# Patient Record
Sex: MALE | Race: BLACK OR AFRICAN AMERICAN
[De-identification: names, ages, dates, MRNs, and addresses within clinical notes are randomized per-mention and may not be internally consistent; named-entity substitution may affect disease eponyms.]

---

## 2017-01-16 ENCOUNTER — HOSPITAL ENCOUNTER (EMERGENCY)
Dept: HOSPITAL 17 - NEPB | Age: 27
Discharge: HOME | End: 2017-01-16
Payer: COMMERCIAL

## 2017-01-16 VITALS — WEIGHT: 130.07 LBS | HEIGHT: 71 IN | BODY MASS INDEX: 18.21 KG/M2

## 2017-01-16 VITALS
SYSTOLIC BLOOD PRESSURE: 136 MMHG | OXYGEN SATURATION: 86 % | DIASTOLIC BLOOD PRESSURE: 85 MMHG | RESPIRATION RATE: 16 BRPM | TEMPERATURE: 98 F | HEART RATE: 84 BPM

## 2017-01-16 DIAGNOSIS — E11.65: Primary | ICD-10-CM

## 2017-01-16 PROCEDURE — 99281 EMR DPT VST MAYX REQ PHY/QHP: CPT

## 2017-01-16 NOTE — PD
HPI


Chief Complaint:  Medication Refill Request


Time Seen by Provider:  19:34


Travel History


International Travel<30 days:  No


Contact w/Intl Traveler<30days:  No


Traveled to known affect area:  No





History of Present Illness


HPI


26-year-old black male with a history of IDDM presents to emergency department 

requesting a refill of his Humalog and Lantus insulin.  He states that he is 

just gotten a new insurance and cannot see a new provider until the end of the 

month.  He is requesting a refill.  He denies any acute medical complaints.  No 

fever or chills.  No nausea vomiting.  No abdominal issues.  He states that his 

sugars have been under control.





PFSH


Past Medical History


Asthma:  Yes


Autoimmune Disease:  No


Cancer:  No


Cardiovascular Problems:  Yes


Diabetes:  Yes


Diminished Hearing:  No


Endocrine:  Yes (type 1 diabetic)


Genitourinary:  No


Immune Disorder:  No


Musculoskeletal:  No


Neurologic:  No


Psychiatric:  No


Reproductive:  No


Respiratory:  No


Immunizations Current:  No


Sickle Cell Disease:  No





Past Surgical History


AICD:  No


Arteriovenous Shunt:  No


Insulin Pump:  No


Joint Replacement:  No


Pacemaker:  Yes





Social History


Alcohol Use:  Yes (OCCASIONALLY)


Tobacco Use:  No


Substance Use:  Yes (MARIJUANA OCC)





Allergies-Medications


(Allergen,Severity, Reaction):  


Coded Allergies:  


     No Known Allergies (Verified , 1/16/17)


Reported Meds & Prescriptions





Reported Meds & Active Scripts


Active


Reported


Crestor (Rosuvastatin Calcium) 10 Mg Tab 10 Mg PO DAILY


Levemir Inj (Insulin Detemir) 1,000 unit/ 10 ML Vial 30 Units SQ DAILY


     Do not mix with any other Insulin.


Novolog Inj (Insulin Aspart) 1,000 Unit/10 Ml Vial 2-12 Units SQ ACHS


     Max dose at bedtime ( ) units; sugars less than 70,(0) units; sugars


     150-199,(2) units; sugars 200-249,(4) units; sugars 250-299,(7) units;


     sugars 300-349,(10) units; sugars greater than 349,(12)units


Lisinopril 5 Mg Tab 5 Mg PO DAILY








Review of Systems


Except as stated in HPI:  all other systems reviewed are Neg





Physical Exam


Narrative


GENERAL: This is a well-nourished, well-developed patient, in no apparent 

distress.


SKIN: No rashes, ecchymoses or lesions. Warm and dry.


HEAD: Atraumatic. Normocephalic.


EYES: PERRL, EOMI, no discharge or injection. No scleral icterus.


EARS: Clear


NOSE: Nasal turbinates appear normal.


THROAT: Mucosa pink and moist.  Airway patent.


NECK: Trachea midline. supple, moves head freely.


LUNGS: Clear to auscultation.


CV: Regular in rhythm.


ABDOMEN: Soft nontender.


EXT: No clubbing cyanosis or edema.





Data


Data


Last Documented VS





Vital Signs








  Date Time  Temp Pulse Resp B/P Pulse Ox O2 Delivery O2 Flow Rate FiO2


 


1/16/17 19:32   16     


 


1/16/17 19:14 98.0 84  136/85 86   











MDM


Medical Decision Making


Medical Screen Exam Complete:  Yes


Emergency Medical Condition:  Yes


Medical Record Reviewed:  Yes


Differential Diagnosis


Differential diagnosis: IDDM, hypoglycemia, hyperglycemia, med refill


Narrative Course


The patient is given a refill of his Humalog and Lantus.  The patient states 

that he takes Humalog on a sliding scale and Lantus 30 mg in the morning.





Diagnosis





 Primary Impression:  


 Hyperglycemia due to type 1 diabetes mellitus


 Additional Impression:  


 Medication refill


Patient Instructions:  General Instructions





***Additional Instructions:


Rest.


Monitor sugars daily.


Medications as directed.


Follow-up with a medical doctor within the next 2-4 weeks.


Return to the ER for any problems.


***Med/Other Pt SpecificInfo:  Prescription(s) given


Scripts


Insulin Glargine Inj (Lantus Inj)1,000 Unit/10 Ml Vial30 Units SQ AC BREAKFAST  

#1 VIAL  Ref 1


   Prov:Hanna Solis MD         1/16/17 


Insulin Lispro (Human) Inj (Humalog Inj)1,000 Unit/10 Ml Vial5-25 Units SQ ACHS

  #1 VIAL  Ref 1


   Max dose at bedtime:(  )units; sugars < 70,(0)units; sugars


   150-199,(5)units; sugars 200-249,(10)units; sugars 250-299,(15)units;


   sugars 300-349,(20)units; sugars more than 349,(25)units.


   Prov:Hanna Solis MD         1/16/17


Disposition:  01 DISCHARGE HOME


Condition:  Stable








Harsh Tsang Jan 16, 2017 19:37

## 2017-01-25 ENCOUNTER — HOSPITAL ENCOUNTER (EMERGENCY)
Dept: HOSPITAL 17 - NED | Age: 27
Discharge: LEFT BEFORE DISCHARGE | End: 2017-01-25
Payer: COMMERCIAL

## 2017-01-25 VITALS
DIASTOLIC BLOOD PRESSURE: 71 MMHG | OXYGEN SATURATION: 98 % | RESPIRATION RATE: 15 BRPM | SYSTOLIC BLOOD PRESSURE: 117 MMHG | HEART RATE: 78 BPM | TEMPERATURE: 98.2 F

## 2017-01-25 DIAGNOSIS — R06.00: Primary | ICD-10-CM

## 2017-01-25 DIAGNOSIS — R11.10: ICD-10-CM

## 2017-01-25 PROCEDURE — 99281 EMR DPT VST MAYX REQ PHY/QHP: CPT

## 2017-03-08 ENCOUNTER — HOSPITAL ENCOUNTER (EMERGENCY)
Dept: HOSPITAL 17 - PHED | Age: 27
Discharge: HOME | End: 2017-03-08
Payer: COMMERCIAL

## 2017-03-08 VITALS
DIASTOLIC BLOOD PRESSURE: 95 MMHG | OXYGEN SATURATION: 98 % | HEART RATE: 78 BPM | SYSTOLIC BLOOD PRESSURE: 132 MMHG | TEMPERATURE: 98.1 F

## 2017-03-08 VITALS — SYSTOLIC BLOOD PRESSURE: 133 MMHG | OXYGEN SATURATION: 98 % | DIASTOLIC BLOOD PRESSURE: 93 MMHG | HEART RATE: 86 BPM

## 2017-03-08 VITALS — BODY MASS INDEX: 30.71 KG/M2 | WEIGHT: 219.36 LBS | HEIGHT: 71 IN

## 2017-03-08 DIAGNOSIS — E78.00: ICD-10-CM

## 2017-03-08 DIAGNOSIS — Z76.0: ICD-10-CM

## 2017-03-08 DIAGNOSIS — I10: ICD-10-CM

## 2017-03-08 DIAGNOSIS — Z79.4: ICD-10-CM

## 2017-03-08 DIAGNOSIS — E10.65: Primary | ICD-10-CM

## 2017-03-08 DIAGNOSIS — Z95.0: ICD-10-CM

## 2017-03-08 LAB
ALP SERPL-CCNC: 149 U/L (ref 45–117)
ALT SERPL-CCNC: 26 U/L (ref 12–78)
ANION GAP SERPL CALC-SCNC: 10 MEQ/L (ref 5–15)
AST SERPL-CCNC: 14 U/L (ref 15–37)
B-OH-BUTYR SERPL-SCNC: 0.18 MMOL/L (ref 0–0.39)
BASOPHILS # BLD AUTO: 0.2 TH/MM3 (ref 0–0.2)
BASOPHILS NFR BLD: 2.8 % (ref 0–2)
BILIRUB SERPL-MCNC: 0.7 MG/DL (ref 0.2–1)
BLOOD GAS VENOUS BASE EXCESS: -0.5 MMOL/L (ref -2–2)
BLOOD GAS VENOUS HCO3: 24 MMOL/L (ref 22–26)
BLOOD GAS VENOUS PCO2: 45 MMHG (ref 44–48)
BLOOD GAS VENOUS PH: 7.35 (ref 7.36–7.4)
BLOOD GAS VENOUS PO2: 60 MMHG (ref 35–40)
BUN SERPL-MCNC: 15 MG/DL (ref 7–18)
CD3+CD4+ CELLS # BLD: 85 % (ref 70–76)
CHLORIDE SERPL-SCNC: 98 MEQ/L (ref 98–107)
COLOR UR: (no result)
COMMENT (UR): (no result)
CRITICAL VALUE: YES
CULTURE IF INDICATED: (no result)
DRAW SITE: (no result)
EOSINOPHIL # BLD: 0.1 TH/MM3 (ref 0–0.4)
EOSINOPHIL NFR BLD: 0.9 % (ref 0–4)
ERYTHROCYTE [DISTWIDTH] IN BLOOD BY AUTOMATED COUNT: 13.9 % (ref 11.6–17.2)
GFR SERPLBLD BASED ON 1.73 SQ M-ARVRAT: 74 ML/MIN (ref 89–?)
GLUCOSE UR STRIP-MCNC: (no result) MG/DL
HCO3 BLD-SCNC: 27.5 MEQ/L (ref 21–32)
HCT VFR BLD CALC: 41.1 % (ref 39–51)
HEMO FLAGS: (no result)
HGB UR QL STRIP: (no result)
KETONES UR STRIP-MCNC: (no result) MG/DL
LYMPHOCYTES # BLD AUTO: 2.3 TH/MM3 (ref 1–4.8)
LYMPHOCYTES NFR BLD AUTO: 36.1 % (ref 9–44)
MCH RBC QN AUTO: 27.6 PG (ref 27–34)
MCHC RBC AUTO-ENTMCNC: 33.4 % (ref 32–36)
MCV RBC AUTO: 82.6 FL (ref 80–100)
METHAMPHET UR QL: 15.7 VOL % (ref 9–17)
MONOCYTES NFR BLD: 7.1 % (ref 0–8)
NEUTROPHILS # BLD AUTO: 3.3 TH/MM3 (ref 1.8–7.7)
NEUTROPHILS NFR BLD AUTO: 53.1 % (ref 16–70)
NITRITE UR QL STRIP: (no result)
O2/TOTAL GAS SETTING VFR VENT: 21 %
PLATELET # BLD: 208 TH/MM3 (ref 150–450)
POTASSIUM SERPL-SCNC: 4.2 MEQ/L (ref 3.5–5.1)
RBC # BLD AUTO: 4.98 MIL/MM3 (ref 4.5–5.9)
SODIUM SERPL-SCNC: 135 MEQ/L (ref 136–145)
SP GR UR STRIP: 1.02 (ref 1–1.03)
SQUAMOUS #/AREA URNS HPF: (no result) /HPF (ref 0–5)
STAT: YES
TEMP CORR TO: 98.6
WBC # BLD AUTO: 6.4 TH/MM3 (ref 4–11)

## 2017-03-08 PROCEDURE — 85025 COMPLETE CBC W/AUTO DIFF WBC: CPT

## 2017-03-08 PROCEDURE — 96374 THER/PROPH/DIAG INJ IV PUSH: CPT

## 2017-03-08 PROCEDURE — 81001 URINALYSIS AUTO W/SCOPE: CPT

## 2017-03-08 PROCEDURE — 80053 COMPREHEN METABOLIC PANEL: CPT

## 2017-03-08 PROCEDURE — 82010 KETONE BODYS QUAN: CPT

## 2017-03-08 PROCEDURE — 99285 EMERGENCY DEPT VISIT HI MDM: CPT

## 2017-03-08 PROCEDURE — 82805 BLOOD GASES W/O2 SATURATION: CPT

## 2017-03-08 PROCEDURE — 96361 HYDRATE IV INFUSION ADD-ON: CPT

## 2017-03-08 NOTE — PD
HPI


Chief Complaint:  Diabetic


Time Seen by Provider:  01:49


Travel History


International Travel<30 days:  No


Contact w/Intl Traveler<30days:  No


Traveled to known affect area:  No





History of Present Illness


HPI


26-year-old male presents to the emergency department for elevated blood sugar.

  Patient is insulin-dependent diabetic since the age of 13.  Patient states 

that he has been out of his insulin since Friday.  Patient notes that he has 

been decreasing his oral intake because he hasn't had insulin available to take 

for his blood sugar management.  Patient denies fever chills nausea vomiting 

shortness of breath chest pain back pain abdominal pain and has had no recent 

injuries skin rash or joint pain or swelling diarrheal illness or respiratory 

illness.  Patient states that he is in the process of trying to become 

established with a new primary care provider but has actually been without a 

primary managing physician for approximately 5 months.  Patient has been 

getting refills to the emergency department for his insulin.  Patient was last 

given refill of medication in January through [facility].  Patient denies 

other concerns or complaints.





PFSH


Past Medical History


*** Narrative Medical


Asthma dyslipidemia and insulin-dependent diabetes; prior tobacco use 

occasional marijuana use occasional alcohol use; nursing notes reviewed


Asthma:  Yes


Cancer:  No


Cardiovascular Problems:  Yes


High Cholesterol:  Yes


Diabetes:  Yes


Patient Takes Glucophage:  No


Diminished Hearing:  No


Endocrine:  Yes (type 1 diabetic)


Hypertension:  Yes


Immune Disorder:  No


Immunizations Current:  No


Sickle Cell Disease:  No


Tetanus Vaccination:  Unknown


Influenza Vaccination:  Yes





Past Surgical History


Surgical History:  No Previous Surgery


AICD:  No


Arteriovenous Shunt:  No


Insulin Pump:  No


Joint Replacement:  No


Pacemaker:  Yes





Social History


Alcohol Use:  Yes (OCCASIONALLY)


Tobacco Use:  Yes (quit 02/17/17)


Substance Use:  Yes (MARIJUANA OCC)





Allergies-Medications


(Allergen,Severity, Reaction):  


Coded Allergies:  


     No Known Allergies (Verified , 3/8/17)


Reported Meds & Prescriptions





Reported Meds & Active Scripts


Active


Humalog Inj (Insulin Human Lispro) 1,000 Unit/10 Ml Vial 5-25 Units SQ ACHS


     Max dose at bedtime:(  )units; sugars < 70,(0)units; sugars


     150-199,(5)units; sugars 200-249,(10)units; sugars 250-299,(15)units;


     sugars 300-349,(20)units; sugars more than 349,(25)units.


Lantus Inj (Insulin Glargine) 1,000 Unit/10 Ml Vial 30 Units SQ AC DINNER


Lantus Inj (Insulin Glargine) 1,000 Unit/10 Ml Vial 30 Units SQ AC BREAKFAST


Humalog Inj (Insulin Human Lispro) 1,000 Unit/10 Ml Vial 5-25 Units SQ ACHS


     Max dose at bedtime:(  )units; sugars < 70,(0)units; sugars


     150-199,(5)units; sugars 200-249,(10)units; sugars 250-299,(15)units;


     sugars 300-349,(20)units; sugars more than 349,(25)units.








Review of Systems


Except as stated in HPI:  all other systems reviewed are Neg


General / Constitutional:  No: Fever, Chills


Eyes:  No: Visual changes


HENT:  No: Headaches, Lightheadedness, Sore Throat, Congestion


Cardiovascular:  No: Chest Pain or Discomfort, Palpitations, Diaphoresis


Respiratory:  No: Shortness of Breath


Gastrointestinal:  No: Nausea, Vomiting, Diarrhea, Abdominal Pain


Genitourinary:  No: Urgency, Frequency, Dysuria


Musculoskeletal:  No: Myalgias, Arthralgias


Skin:  No Rash


Neurologic:  No: Weakness, Dizziness, Syncope


Psychiatric:  No: Anxiety


Endocrine:  Positive: Polyuria


Hematologic/Lymphatic:  No: Easy Bruising





Physical Exam


Narrative


GENERAL: Well-developed well-nourished male in no acute distress no respiratory 

distress; GCS 15


SKIN: Warm and dry.


HEAD: Normocephalic.


EYES: No scleral icterus. No injection or drainage.  ENT: Mucous membranes 

moist airway is patent.


NECK: Supple, trachea midline. No JVD or lymphadenopathy.


CARDIOVASCULAR: Regular rate and rhythm without murmurs, gallops, or rubs. 


RESPIRATORY: Breath sounds equal bilaterally. No accessory muscle use.


GASTROINTESTINAL: Abdomen soft, non-tender, nondistended. 


MUSCULOSKELETAL: No cyanosis, or edema. 


BACK: Nontender without obvious deformity. No CVA tenderness.








Data


Data


Last Documented VS





Vital Signs








  Date Time  Temp Pulse Resp B/P Pulse Ox O2 Delivery O2 Flow Rate FiO2


 


3/8/17 04:44  86  133/93 98 Room Air  


 


3/8/17 00:55 98.1       








Orders





 Complete Blood Count With Diff (3/8/17 01:43)


Comprehensive Metabolic Panel (3/8/17 01:43)


Beta Hydroxybutyrate (Acetone) (3/8/17 01:43)


Urinalysis - C+S If Indicated (3/8/17 01:43)


Blood Glucose (3/8/17 01:43)


Ecg Monitoring (3/8/17 01:43)


Iv Access Insert/Monitor (3/8/17 01:43)


Oximetry (3/8/17 01:43)


NPO (3/8/17 01:43)


Sodium Chlor 0.9% 1000 Ml Inj (Ns 1000 M (3/8/17 01:43)


Sodium Chlor 0.9% 1000 Ml Inj (Ns 1000 M (3/8/17 02:13)


Insulin Human Regular Inj (Novolin R Inj (3/8/17 01:45)


Blood Gas Venous (Vbg) (3/8/17 03:19)





Labs





 Laboratory Tests








Test 3/8/17 3/8/17 3/8/17





 01:30 01:45 03:35


 


Urine Color STRAW   


 


Urine Turbidity CLEAR   


 


Urine pH 6.5   


 


Urine Specific Gravity 1.023   


 


Urine Protein NEG mg/dL  


 


Urine Glucose (UA) 1000 OR  





 GREATER mg/dL  


 


Urine Ketones NEG mg/dL  


 


Urine Occult Blood NEG   


 


Urine Nitrite NEG   


 


Urine Bilirubin NEG   


 


Urine Leukocyte Esterase NEG   


 


Urine Squamous Epithelial 0-5 /hpf  





Cells   


 


Microscopic Urinalysis Comment CULT NOT  





 INDICATED  


 


White Blood Count  6.4 TH/MM3 


 


Red Blood Count  4.98 MIL/MM3 


 


Hemoglobin  13.7 GM/DL 


 


Hematocrit  41.1 % 


 


Mean Corpuscular Volume  82.6 FL 


 


Mean Corpuscular Hemoglobin  27.6 PG 


 


Mean Corpuscular Hemoglobin  33.4 % 





Concent   


 


Red Cell Distribution Width  13.9 % 


 


Platelet Count  208 TH/MM3 


 


Mean Platelet Volume  8.6 FL 


 


Neutrophils (%) (Auto)  53.1 % 


 


Lymphocytes (%) (Auto)  36.1 % 


 


Monocytes (%) (Auto)  7.1 % 


 


Eosinophils (%) (Auto)  0.9 % 


 


Basophils (%) (Auto)  2.8 % 


 


Neutrophils # (Auto)  3.3 TH/MM3 


 


Lymphocytes # (Auto)  2.3 TH/MM3 


 


Monocytes # (Auto)  0.5 TH/MM3 


 


Eosinophils # (Auto)  0.1 TH/MM3 


 


Basophils # (Auto)  0.2 TH/MM3 


 


CBC Comment  DIFF FINAL  


 


Differential Comment    


 


Sodium Level  135 MEQ/L 


 


Potassium Level  4.2 MEQ/L 


 


Chloride Level  98 MEQ/L 


 


Carbon Dioxide Level  27.5 MEQ/L 


 


Anion Gap  10 MEQ/L 


 


Blood Urea Nitrogen  15 MG/DL 


 


Creatinine  1.40 MG/DL 


 


Estimat Glomerular Filtration  74 ML/MIN 





Rate   


 


Random Glucose  607 MG/DL 


 


Calcium Level  8.7 MG/DL 


 


Total Bilirubin  0.7 MG/DL 


 


Aspartate Amino Transf  14 U/L 





(AST/SGOT)   


 


Alanine Aminotransferase  26 U/L 





(ALT/SGPT)   


 


Alkaline Phosphatase  149 U/L 


 


Total Protein  7.3 GM/DL 


 


Albumin  3.6 GM/DL 


 


B-Hydroxybutyrate  0.18 MMOL/L 


 


Blood Gas Puncture Site   VBG 


 


Blood Gas Patient Temperature   98.6 


 


Venous Blood pH   7.35 


 


Venous Blood Partial Pressure   45 mmHg





CO2   


 


Venous Blood Partial Pressure   60 mmHg





O2   


 


Venous Blood HCO3   24 mmol/L


 


Venous Blood Oxygen Saturation   85 %


 


Venous Blood Oxygen Content   15.7 Vol %


 


Venous Blood Base Excess   -0.5 mmol/L


 


Blood Gas Inspired Oxygen   21 %











MDM


Medical Decision Making


Medical Screen Exam Complete:  Yes


Emergency Medical Condition:  Yes


Medical Record Reviewed:  Yes


Interpretation(s)





 Vital Signs








  Date Time  Temp Pulse Resp B/P Pulse Ox O2 Delivery O2 Flow Rate FiO2


 


3/8/17 01:50      Room Air  


 


3/8/17 00:55 98.1 78  132/95 98 Room Air  





CBC & BMP Diagram


3/8/17 01:45








Differential Diagnosis


Hyperglycemia/uncontrolled diabetes; DKA; electrolyte disturbance; medication 

refill


Narrative Course


IV access obtained specimens collected and sent for resulting patient given 2 L 

normal saline and regular insulin 10 units IV and recheck glucose 1 hour after 

insulin administration


Patient reports that he feels clinically improved


Repeat glucose 383 at one hour and 297 at 2 hours post IV insulin and IV fluids


CBC with automated differential values in normal range; metabolic panel 

remarkable for elevated random glucose with normal range bicarbonate and anion 

gap; patient had reactive icteric acid is not elevated at 0.18 and urinalysis 

shows only glucosuria no ketonuria; Patient doesn't appear to be inn dka --

medications will be refilled and patient discharged to home.





Diagnosis





 Primary Impression:  


 Hyperglycemia due to type 1 diabetes mellitus


 Additional Impression:  


 Medication refill


Referrals:  


Primary Care Physician


call for appointment


Patient Instructions:  General Instructions





***Additional Instructions:


Follow diabetic diet closely


Take insulin as prescribed


Follow-up with primary care provider


Return to the emergency department for any concerns or change in condition


***Med/Other Pt SpecificInfo:  Prescription(s) given


Scripts


Insulin Lispro (Human) Inj (Humalog Inj)1,000 Unit/10 Ml Vial5-25 Units SQ ACHS

  #1 VIAL  Ref 1


   Max dose at bedtime:(  )units; sugars < 70,(0)units; sugars


   150-199,(5)units; sugars 200-249,(10)units; sugars 250-299,(15)units;


   sugars 300-349,(20)units; sugars more than 349,(25)units.


   Prov:Yamel Yun MD         3/8/17 


Insulin Glargine Inj (Lantus Inj)1,000 Unit/10 Ml Vial30 Units SQ AC DINNER  #1 

VIAL  Ref 1


   Prov:Yamel Yun MD         3/8/17


Disposition:  01 DISCHARGE HOME


Condition:  Stable








Yamel Yun MD Mar 8, 2017 01:53

## 2017-04-13 ENCOUNTER — HOSPITAL ENCOUNTER (EMERGENCY)
Dept: HOSPITAL 17 - PHED | Age: 27
LOS: 1 days | Discharge: HOME | End: 2017-04-14
Payer: COMMERCIAL

## 2017-04-13 VITALS — DIASTOLIC BLOOD PRESSURE: 58 MMHG | SYSTOLIC BLOOD PRESSURE: 115 MMHG | HEART RATE: 81 BPM | OXYGEN SATURATION: 98 %

## 2017-04-13 VITALS — HEIGHT: 71 IN | BODY MASS INDEX: 31.33 KG/M2 | WEIGHT: 223.77 LBS

## 2017-04-13 VITALS
TEMPERATURE: 97.8 F | RESPIRATION RATE: 16 BRPM | HEART RATE: 98 BPM | OXYGEN SATURATION: 99 % | SYSTOLIC BLOOD PRESSURE: 142 MMHG | DIASTOLIC BLOOD PRESSURE: 91 MMHG

## 2017-04-13 DIAGNOSIS — Z79.4: ICD-10-CM

## 2017-04-13 DIAGNOSIS — E11.65: Primary | ICD-10-CM

## 2017-04-13 DIAGNOSIS — Z91.138: ICD-10-CM

## 2017-04-13 LAB
ANION GAP SERPL CALC-SCNC: 12 MEQ/L (ref 5–15)
B-OH-BUTYR SERPL-SCNC: 0.19 MMOL/L (ref 0–0.39)
BASOPHILS # BLD AUTO: 0 TH/MM3 (ref 0–0.2)
BASOPHILS NFR BLD: 0.7 % (ref 0–2)
BUN SERPL-MCNC: 14 MG/DL (ref 7–18)
CHLORIDE SERPL-SCNC: 99 MEQ/L (ref 98–107)
EOSINOPHIL # BLD: 0.1 TH/MM3 (ref 0–0.4)
EOSINOPHIL NFR BLD: 1.2 % (ref 0–4)
ERYTHROCYTE [DISTWIDTH] IN BLOOD BY AUTOMATED COUNT: 12.9 % (ref 11.6–17.2)
GFR SERPLBLD BASED ON 1.73 SQ M-ARVRAT: 68 ML/MIN (ref 89–?)
HCO3 BLD-SCNC: 23.7 MEQ/L (ref 21–32)
HCT VFR BLD CALC: 42.4 % (ref 39–51)
HEMO FLAGS: (no result)
LYMPHOCYTES # BLD AUTO: 2.3 TH/MM3 (ref 1–4.8)
LYMPHOCYTES NFR BLD AUTO: 39.6 % (ref 9–44)
MCH RBC QN AUTO: 28.2 PG (ref 27–34)
MCHC RBC AUTO-ENTMCNC: 34.2 % (ref 32–36)
MCV RBC AUTO: 82.7 FL (ref 80–100)
MONOCYTES NFR BLD: 7.9 % (ref 0–8)
NEUTROPHILS # BLD AUTO: 3 TH/MM3 (ref 1.8–7.7)
NEUTROPHILS NFR BLD AUTO: 50.6 % (ref 16–70)
PLATELET # BLD: 202 TH/MM3 (ref 150–450)
POTASSIUM SERPL-SCNC: 4 MEQ/L (ref 3.5–5.1)
RBC # BLD AUTO: 5.12 MIL/MM3 (ref 4.5–5.9)
SODIUM SERPL-SCNC: 135 MEQ/L (ref 136–145)
WBC # BLD AUTO: 5.9 TH/MM3 (ref 4–11)

## 2017-04-13 PROCEDURE — 82010 KETONE BODYS QUAN: CPT

## 2017-04-13 PROCEDURE — 80048 BASIC METABOLIC PNL TOTAL CA: CPT

## 2017-04-13 PROCEDURE — 96361 HYDRATE IV INFUSION ADD-ON: CPT

## 2017-04-13 PROCEDURE — 85025 COMPLETE CBC W/AUTO DIFF WBC: CPT

## 2017-04-13 PROCEDURE — 96372 THER/PROPH/DIAG INJ SC/IM: CPT

## 2017-04-13 PROCEDURE — 99282 EMERGENCY DEPT VISIT SF MDM: CPT

## 2017-04-13 PROCEDURE — 81001 URINALYSIS AUTO W/SCOPE: CPT

## 2017-04-13 PROCEDURE — 96374 THER/PROPH/DIAG INJ IV PUSH: CPT

## 2017-04-13 NOTE — PD
HPI


Chief Complaint:  Medication Refill Request


Time Seen by Provider:  22:54


Travel History


International Travel<30 days:  No


Contact w/Intl Traveler<30days:  No


Traveled to known affect area:  No





History of Present Illness


HPI


Patient is a 26-year-old male with history of type 1 diabetes who comes in 

because he ran out of his insulin today.  He says he has been trying to get a 

primary care doctor for the past 3 months but no one who accepts his insurance 

is accepting new patients.  He says he has had increased thirst and urination, 

but denies any other complaints.  He denies any abdominal pain, nausea, vomiting

, fever or chills.  He says he usually takes Lantus and Novolog 70/30.





PFSH


Past Medical History


Asthma:  Yes


Cancer:  No


Cardiovascular Problems:  Yes


High Cholesterol:  Yes


Diabetes:  Yes


Patient Takes Glucophage:  No


Diminished Hearing:  No


Endocrine:  Yes (type 1 diabetic)


Hypertension:  Yes


Immune Disorder:  No


Immunizations Current:  No


Sickle Cell Disease:  No





Past Surgical History


Surgical History:  No Previous Surgery


AICD:  No


Arteriovenous Shunt:  No


Insulin Pump:  No


Joint Replacement:  No


Pacemaker:  Yes


Other Surgery:  No





Social History


Alcohol Use:  Yes (OCCASIONALLY)


Tobacco Use:  Yes (quit 02/17/17)


Substance Use:  Yes (MARIJUANA OCC)





Allergies-Medications


(Allergen,Severity, Reaction):  


Coded Allergies:  


     No Known Allergies (Verified , 4/13/17)


Reported Meds & Prescriptions





Reported Meds & Active Scripts


Active


Humalog Inj (Insulin Human Lispro) 1,000 Unit/10 Ml Vial 5-25 Units SQ ACHS 30 

Days


     Max dose at bedtime:(  )units; sugars < 70,(0)units; sugars


     150-199,(5)units; sugars 200-249,(10)units; sugars 250-299,(15)units;


     sugars 300-349,(20)units; sugars more than 349,(25)units.


Lantus Inj (Insulin Glargine) 1,000 Unit/10 Ml Vial 30 Units SQ HS 30 Days


Humalog Inj (Insulin Human Lispro) 1,000 Unit/10 Ml Vial 5-25 Units SQ ACHS


     Max dose at bedtime:(  )units; sugars < 70,(0)units; sugars


     150-199,(5)units; sugars 200-249,(10)units; sugars 250-299,(15)units;


     sugars 300-349,(20)units; sugars more than 349,(25)units.


Lantus Inj (Insulin Glargine) 1,000 Unit/10 Ml Vial 30 Units SQ AC DINNER


Lantus Inj (Insulin Glargine) 1,000 Unit/10 Ml Vial 30 Units SQ AC BREAKFAST


Humalog Inj (Insulin Human Lispro) 1,000 Unit/10 Ml Vial 5-25 Units SQ ACHS


     Max dose at bedtime:(  )units; sugars < 70,(0)units; sugars


     150-199,(5)units; sugars 200-249,(10)units; sugars 250-299,(15)units;


     sugars 300-349,(20)units; sugars more than 349,(25)units.








Review of Systems


Except as stated in HPI:  all other systems reviewed are Neg


General / Constitutional:  No: Fever, Chills


HENT:  No: Headaches, Lightheadedness


Cardiovascular:  No: Chest Pain or Discomfort


Respiratory:  No: Shortness of Breath


Gastrointestinal:  No: Nausea, Vomiting, Abdominal Pain


Musculoskeletal:  No: Limited ROM


Skin:  No Rash, No Change in Pigmentation


Neurologic:  No: Weakness, Dizziness


Endocrine:  Positive: Polyuria, Polydipsia





Physical Exam


Narrative


GENERAL: Awake and alert, in no acute distress.


SKIN: Focused skin assessment warm/dry.


HEAD: Atraumatic. Normocephalic. 


EYES: Pupils equal and round. No scleral icterus. 


ENT: Mucous membranes pink and moist.


NECK: Trachea midline. No JVD. 


CARDIOVASCULAR: Regular rate and rhythm.  No murmur appreciated.


RESPIRATORY: No accessory muscle use. Clear to auscultation. Breath sounds 

equal bilaterally. 


GASTROINTESTINAL: Abdomen soft, non-tender, nondistended. 


MUSCULOSKELETAL: No obvious deformities. No clubbing.  No cyanosis.  No edema. 


NEUROLOGICAL: Awake and alert. No obvious cranial nerve deficits.  Motor 

grossly within normal limits. Normal speech.


PSYCHIATRIC: Appropriate mood and affect; insight and judgment normal.





Data


Data


Last Documented VS





Vital Signs








  Date Time  Temp Pulse Resp B/P Pulse Ox O2 Delivery O2 Flow Rate FiO2


 


4/13/17 23:51  81  115/58 98 Room Air  


 


4/13/17 22:40 97.8  16     








Orders





 Complete Blood Count With Diff (4/13/17 22:54)


Basic Metabolic Panel (Bmp) (4/13/17 22:54)


Beta Hydroxybutyrate (Acetone) (4/13/17 22:54)


Urinalysis - C+S If Indicated (4/13/17 22:54)


Sodium Chlor 0.9% 1000 Ml Inj (Ns 1000 M (4/13/17 23:00)


Insulin Human Regular Inj (Novolin R Inj (4/13/17 23:00)


Insulin Human Regular Inj (Novolin R Inj (4/14/17 00:00)


Sodium Chlor 0.9% 1000 Ml Inj (Ns 1000 M (4/14/17 00:00)





Labs





 Laboratory Tests








Test 4/13/17 4/14/17





 23:10 01:01


 


White Blood Count 5.9 TH/MM3 


 


Red Blood Count 5.12 MIL/MM3 


 


Hemoglobin 14.5 GM/DL 


 


Hematocrit 42.4 % 


 


Mean Corpuscular Volume 82.7 FL 


 


Mean Corpuscular Hemoglobin 28.2 PG 


 


Mean Corpuscular Hemoglobin 34.2 % 





Concent  


 


Red Cell Distribution Width 12.9 % 


 


Platelet Count 202 TH/MM3 


 


Mean Platelet Volume 8.2 FL 


 


Neutrophils (%) (Auto) 50.6 % 


 


Lymphocytes (%) (Auto) 39.6 % 


 


Monocytes (%) (Auto) 7.9 % 


 


Eosinophils (%) (Auto) 1.2 % 


 


Basophils (%) (Auto) 0.7 % 


 


Neutrophils # (Auto) 3.0 TH/MM3 


 


Lymphocytes # (Auto) 2.3 TH/MM3 


 


Monocytes # (Auto) 0.5 TH/MM3 


 


Eosinophils # (Auto) 0.1 TH/MM3 


 


Basophils # (Auto) 0.0 TH/MM3 


 


CBC Comment DIFF FINAL  


 


Differential Comment   


 


Sodium Level 135 MEQ/L 


 


Potassium Level 4.0 MEQ/L 


 


Chloride Level 99 MEQ/L 


 


Carbon Dioxide Level 23.7 MEQ/L 


 


Anion Gap 12 MEQ/L 


 


Blood Urea Nitrogen 14 MG/DL 


 


Creatinine 1.50 MG/DL 


 


Estimat Glomerular Filtration 68 ML/MIN 





Rate  


 


Random Glucose 457 MG/DL 


 


Calcium Level 9.0 MG/DL 


 


B-Hydroxybutyrate 0.19 MMOL/L 


 


Urine Color  STRAW 


 


Urine Turbidity  CLEAR 


 


Urine pH  5.5 


 


Urine Specific Gravity  1.025 


 


Urine Protein  NEG mg/dL


 


Urine Glucose (UA)  1000 OR





  GREATER mg/dL


 


Urine Ketones  NEG mg/dL


 


Urine Occult Blood  NEG 


 


Urine Nitrite  NEG 


 


Urine Bilirubin  NEG 


 


Urine Leukocyte Esterase  NEG 


 


Urine RBC  0-2 /hpf


 


Urine WBC  0-2 /hpf


 


Urine Squamous Epithelial  0-5 /hpf





Cells  


 


Urine Bacteria  NONE /hpf


 


Microscopic Urinalysis Comment  CULT NOT





  INDICATED











MDM


Medical Decision Making


Medical Screen Exam Complete:  Yes


Emergency Medical Condition:  Yes


Medical Record Reviewed:  Yes


Differential Diagnosis


Hyperglycemia versus DKA versus medication refill


Narrative Course


Patient is a 26-year-old male with history of type 1 diabetes who comes in 

because he is out of his insulin.  Exam shows no acute abnormalities.  Glucose 

is found to be 447.  IV established, labs sent.  Patient given insulin IV 

fluids.





AG is 12, bicarb is normal. He has no evidence of DKA.  





Glucose improved to 226.  Patient advised he needs to take his insulin.  

Advised he needs a primary care doctor.  Advised to return to the ED as needed 

for any worsening symptoms.





Diagnosis





 Primary Impression:  


 Hyperglycemia due to type 1 diabetes mellitus


 Additional Impression:  


 Medication refill


Patient Instructions:  Diabetic Hyperglycemia (ED), General Instructions





***Additional Instructions:


Take your insulin.  Drink plenty of water.  Follow up with a primary care 

doctor.  Return to the ED as needed for any worsening symptoms.


Scripts


Insulin Lispro (Human) Inj (Humalog Inj)1,000 Unit/10 Ml Vial5-25 Units SQ ACHS

  30 Days  Ref 0


   Max dose at bedtime:(  )units; sugars < 70,(0)units; sugars


   150-199,(5)units; sugars 200-249,(10)units; sugars 250-299,(15)units;


   sugars 300-349,(20)units; sugars more than 349,(25)units.


   Prov:Tika Linton MD         4/14/17 


Insulin Glargine Inj (Lantus Inj)1,000 Unit/10 Ml Vial30 Units SQ HS  30 Days  

Ref 0


   Prov:Tika Linton MD         4/14/17


Disposition:  01 DISCHARGE HOME


Condition:  Stable








Tika Linton MD Apr 13, 2017 23:37

## 2017-04-14 LAB
BACTERIA #/AREA URNS HPF: (no result) /HPF
COLOR UR: (no result)
COMMENT (UR): (no result)
CULTURE IF INDICATED: (no result)
GLUCOSE UR STRIP-MCNC: (no result) MG/DL
HGB UR QL STRIP: (no result)
KETONES UR STRIP-MCNC: (no result) MG/DL
LEUKOCYTE ESTERASE UR QL STRIP: (no result) /HPF (ref 0–5)
NITRITE UR QL STRIP: (no result)
RBC #/AREA URNS HPF: (no result) /HPF (ref 0–3)
SP GR UR STRIP: 1.02 (ref 1–1.03)
SQUAMOUS #/AREA URNS HPF: (no result) /HPF (ref 0–5)

## 2017-08-15 ENCOUNTER — HOSPITAL ENCOUNTER (EMERGENCY)
Dept: HOSPITAL 17 - PHED | Age: 27
Discharge: HOME | End: 2017-08-15
Payer: COMMERCIAL

## 2017-08-15 VITALS
SYSTOLIC BLOOD PRESSURE: 135 MMHG | RESPIRATION RATE: 15 BRPM | TEMPERATURE: 98.2 F | HEART RATE: 87 BPM | DIASTOLIC BLOOD PRESSURE: 79 MMHG | OXYGEN SATURATION: 98 %

## 2017-08-15 VITALS — HEIGHT: 71 IN | WEIGHT: 225.53 LBS | BODY MASS INDEX: 31.57 KG/M2

## 2017-08-15 DIAGNOSIS — I10: ICD-10-CM

## 2017-08-15 DIAGNOSIS — E10.65: Primary | ICD-10-CM

## 2017-08-15 DIAGNOSIS — Z79.4: ICD-10-CM

## 2017-08-15 PROCEDURE — 99283 EMERGENCY DEPT VISIT LOW MDM: CPT

## 2018-06-02 ENCOUNTER — HOSPITAL ENCOUNTER (EMERGENCY)
Dept: HOSPITAL 17 - NEPD | Age: 28
Discharge: HOME | End: 2018-06-02
Payer: COMMERCIAL

## 2018-06-02 VITALS — BODY MASS INDEX: 30.86 KG/M2 | HEIGHT: 71 IN | WEIGHT: 220.46 LBS

## 2018-06-02 VITALS
OXYGEN SATURATION: 99 % | TEMPERATURE: 98.5 F | DIASTOLIC BLOOD PRESSURE: 96 MMHG | HEART RATE: 87 BPM | SYSTOLIC BLOOD PRESSURE: 145 MMHG | RESPIRATION RATE: 16 BRPM

## 2018-06-02 DIAGNOSIS — Z79.4: ICD-10-CM

## 2018-06-02 DIAGNOSIS — F12.90: ICD-10-CM

## 2018-06-02 DIAGNOSIS — H57.12: Primary | ICD-10-CM

## 2018-06-02 DIAGNOSIS — E10.9: ICD-10-CM

## 2018-06-02 DIAGNOSIS — H57.8: ICD-10-CM

## 2018-06-02 PROCEDURE — 99283 EMERGENCY DEPT VISIT LOW MDM: CPT

## 2018-06-02 NOTE — PD
Physical Exam


Date Seen by Provider:  Jun 2, 2018


Narrative


Patient presents with a painful red left eye.





Data


Data


Last Documented VS





Vital Signs








  Date Time  Temp Pulse Resp B/P (MAP) Pulse Ox O2 Delivery O2 Flow Rate FiO2


 


6/2/18 10:56 98.5 87 16 145/96 (112) 99   








Orders





 Orders


Proparacaine 0.5% Opth Soln (Alcaine 0.5 (6/2/18 11:15)


Acetazolamide (Diamox) (6/2/18 12:00)








MDM


Supervised Visit with BRYAN:  Yes


Narrative Course


I, Dr. Coley, have reviewed the advance practice practitioner's documentation 

and am in agreement, met with the patient face to face, made the diagnosis, and 

the medical decision making was done by me.  





*My assessment and Findings: Patient presents with a painful, red left eye.  

Positive historical findings include decreased vision, headache and severe eye 

pain.  Negative historical findings are halos and nausea/vomiting.





Positive physical findings include conjunctival redness and corneal edema.  

Negative physical findings include shallow anterior chamber and dilated pupil 

which reacts poorly to light.  Specifically, his pupils are equal and react 

normally to light.  The anterior chamber looks deep and is clear.





Reji-Pen revealed markedly elevated pressures in the left eye with relatively 

normal pressures in the right eye.





Because of the possibility of acute angle-closure glaucoma, the patient is 

being treated here with timolol, aproclonidine and pilocarpine as well as oral 

acetazolamide.





Please see Donna Jaramillo NP's  note for results of laboratory and radiographic 

evaluation, ED course, final diagnosis and disposition











Betty Coley MD Jun 2, 2018 13:08

## 2018-06-02 NOTE — PD
HPI


Chief Complaint:  Eye Problems/Injury


Time Seen by Provider:  11:07


Travel History


International Travel<30 days:  No


Contact w/Intl Traveler<30days:  No


Traveled to known affect area:  No





History of Present Illness


HPI


27-year-old male presents emergency department with complaint of left eye 

redness that started last night.  He he also is complaining of left eye pain 

that started on Monday, but has worsened last night into this morning.  Denies 

eye trauma or known foreign body.  He does wear contacts but says he has not 

been wearing them since pain started on Monday.  Admits to removing them last 

Thursday and not wearing them since; has been wearing his glasses  Denies 

change in vision.  Reports photophobia.  Denies fever, vomiting.  Has tried ice 

to the eye for symptom management with no relief.  Rates pain 7/10.  Aggravated 

with light.  No known relieving factors.  No primary care provider.  No known 

allergies.  History of diabetes type 1 and is compliant.  Has no other medical 

complaints.  No other modifying factors or associated signs and symptoms.





PFSH


Past Medical History


Asthma:  Yes


Cancer:  No


Cardiovascular Problems:  Yes


High Cholesterol:  No


Diabetes:  Yes


Diminished Hearing:  No


Endocrine:  Yes (type 1 diabetic)


Hypertension:  Yes


Immune Disorder:  No


Immunizations Current:  No


Sickle Cell Disease:  No





Past Surgical History


AICD:  No


Arteriovenous Shunt:  No


Insulin Pump:  No


Joint Replacement:  No


Pacemaker:  Yes


Other Surgery:  No





Social History


Alcohol Use:  Yes (OCCASIONALLY mix drinks)


Tobacco Use:  Yes (quit 02/17/17)


Substance Use:  Yes (MARIJUANA OCC)





Allergies-Medications


(Allergen,Severity, Reaction):  


Coded Allergies:  


     No Known Allergies (Verified  Adverse Reaction, Unknown, 6/2/18)


Reported Meds & Prescriptions





Reported Meds & Active Scripts


Active


Humalog Inj (Insulin Human Lispro) 1,000 Unit/10 Ml Vial 5-25 Units SQ ACHS


     Max dose at bedtime:(  )units; sugars < 70,(0)units; sugars


     150-199,(5)units; sugars 200-249,(10)units; sugars 250-299,(15)units;


     sugars 300-349,(20)units; sugars more than 349,(25)units.


Lantus Inj (Insulin Glargine) 1,000 Unit/10 Ml Vial 30 Units SQ AC BREAKFAST








Review of Systems


Except as stated in HPI:  all other systems reviewed are Neg





Physical Exam


Narrative


GENERAL: Well-nourished, well-developed black male patient, in no acute distress


SKIN: Warm and dry.


HEAD: Atraumatic. Normocephalic. 


EYES: Pupils equal and round at 3 mm with brisk reaction.   PERRLA.  EOMI.  

visual acuity 20/25 right; left 20/50..  Left lid eversion with no foreign body 

noted.  Left eye with scleral erythema and no lid edema.  No orbital tenderness

, erythema or cellulitis.  Left eye with photophobia.  No consensual 

photophobia.   No scleral icterus.  No drainage.  Woods lamp exam normal.  

Tonometry readings of left eye: 89/89/90/55; right eye 9/13/27/33.  


ENT: Mucosa pink and moist.  Airway patent.  


NECK: Trachea midline.  


CARDIOVASCULAR: Regular rate.


RESPIRATORY: No accessory muscle use. 


GASTROINTESTINAL: Flat.


NEUROLOGICAL: Awake and alert.  Oriented 3.  No obvious cranial nerve 

deficits.  Motor grossly within normal limits. Normal speech. 


PSYCHIATRIC: Appropriate mood and affect; insight and judgment normal.





Data


Data


Last Documented VS





Vital Signs








  Date Time  Temp Pulse Resp B/P (MAP) Pulse Ox O2 Delivery O2 Flow Rate FiO2


 


6/2/18 10:56 98.5 87 16 145/96 (112) 99   








Orders





 Orders


Proparacaine 0.5% Opth Soln (Alcaine 0.5 (6/2/18 11:15)


Acetazolamide (Diamox) (6/2/18 12:00)


Ed Discharge Order (6/2/18 14:48)








[facility]


Medical Decision Making


Medical Screen Exam Complete:  Yes


Emergency Medical Condition:  Yes


Medical Record Reviewed:  Yes


Differential Diagnosis


Corneal abrasion, conjunctivitis, foreign body, glaucoma


Narrative Course


27-year-old male with left eye pain and redness.  The redness started last 

night.  His eye pain was minimal that started on Monday and worsened last night 

and this morning.  Wood's lamp exam is normal.  Pupils are equal and reactive 

bilaterally.  There is some scleral erythema.  I tonometry readings of the left 

eye are severely elevated when compared to the right.  I discussed the findings 

with Dr. Coley and there is concern of acute closure glaucoma.  Plan of care 

discussed and acetazolamide ordered.  


1343:  Dr. Coley speaking with Dr. Crawford, ophthalmologist from [place] and 

transfer was accepted to [facility].  Tonometry recheck of left 

eye 55/66/61.  


1445: The patient does not want to be transferred via EMS and wants to drive 

himself to [place] for evaluation.  Dr. Coley agrees with discharging the 

patient to self transfer.  His wife is here to pick him up.  Instructed patient 

to report to [place] immediately.  He does report decreased eye pain at this 

time, and he says he can now keep his eyes open and does not have much 

sensitivity to light; this is after administration of the acetazolamide .  

Instructed patient to report to [facility] to follow-up with 

ophthalmologist.  He was provided the address to the hospital.  And the name of 

the ER physician he has stated come in contact with.  Instructed patient to 

follow up with primary care provider.  Patient verbalizes understanding and 

agreement with treatment plan.  Patient is medically cleared and stable for 

discharge.  Discussed reasons to return to the emergency department.  Patient 

agrees with treatment plan.  The patients vital signs are stable and the 

patient is stable for outpatient follow-up and treatment.  Patient discharged 

home, stable and in no acute distress.





Diagnosis





 Primary Impression:  


 Left eye pain


 Additional Impression:  


 Redness of left eye


Referrals:  


Ophthalmologist





Primary Care Physician


Patient Instructions:  Eye Pain (ED), General Instructions





***Additional Instructions:  


Go straight to [facility] To Dr. De La Torre in the ER


***Med/Other Pt SpecificInfo:  No Change to Meds, No Meds Exist/No RX given


Disposition:  01 DISCHARGE HOME


Condition:  Stable











Donna Jaramillo Jun 2, 2018 11:28